# Patient Record
Sex: FEMALE | URBAN - NONMETROPOLITAN AREA
[De-identification: names, ages, dates, MRNs, and addresses within clinical notes are randomized per-mention and may not be internally consistent; named-entity substitution may affect disease eponyms.]

---

## 2023-09-08 ENCOUNTER — APPOINTMENT (OUTPATIENT)
Dept: URBAN - NONMETROPOLITAN AREA CLINIC 47 | Age: 38
Setting detail: DERMATOLOGY
End: 2023-09-10

## 2023-09-08 DIAGNOSIS — B35.3 TINEA PEDIS: ICD-10-CM

## 2023-09-08 PROCEDURE — OTHER PRESCRIPTION: OTHER

## 2023-09-08 PROCEDURE — OTHER COUNSELING: OTHER

## 2023-09-08 PROCEDURE — OTHER KOH PREP: OTHER

## 2023-09-08 PROCEDURE — OTHER OTC TREATMENT REGIMEN: OTHER

## 2023-09-08 PROCEDURE — 99202 OFFICE O/P NEW SF 15 MIN: CPT

## 2023-09-08 RX ORDER — KETOCONAZOLE 20 MG/G
THIN LAYER CREAM TOPICAL TWICE A DAY
Qty: 60 | Refills: 1 | Status: ERX | COMMUNITY
Start: 2023-09-08

## 2023-09-08 ASSESSMENT — LOCATION SIMPLE DESCRIPTION DERM: LOCATION SIMPLE: RIGHT PLANTAR SURFACE

## 2023-09-08 ASSESSMENT — LOCATION ZONE DERM: LOCATION ZONE: FEET

## 2023-09-08 ASSESSMENT — LOCATION DETAILED DESCRIPTION DERM: LOCATION DETAILED: RIGHT MEDIAL PLANTAR MIDFOOT

## 2023-09-08 NOTE — PROCEDURE: OTC TREATMENT REGIMEN
Detail Level: Simple
Patient Specific Otc Recommendations (Will Not Stick From Patient To Patient): Start applying LAMISIL 1% cream (OTC) thin layer twice a day also

## 2023-09-08 NOTE — HPI: EVALUATION OF SKIN LESION(S)
Have Your Spot(S) Been Treated In The Past?: has not been treated
Hpi Title: Evaluation of a Skin Lesion
Additional History: Cortisone being used was helping and when she stopped it came back. Path as not used cortisone for 1 moth now and the lesion seemed to have come back. With t(e use of cortisone the lesion disappeared but was a bit itchy.

## 2023-12-01 ENCOUNTER — APPOINTMENT (OUTPATIENT)
Dept: URBAN - NONMETROPOLITAN AREA CLINIC 47 | Age: 38
Setting detail: DERMATOLOGY
End: 2023-12-04

## 2023-12-01 DIAGNOSIS — B35.3 TINEA PEDIS: ICD-10-CM

## 2023-12-01 PROCEDURE — OTHER COUNSELING: OTHER

## 2023-12-01 PROCEDURE — OTHER PRESCRIPTION: OTHER

## 2023-12-01 PROCEDURE — OTHER PRESCRIPTION MEDICATION MANAGEMENT: OTHER

## 2023-12-01 PROCEDURE — 99212 OFFICE O/P EST SF 10 MIN: CPT

## 2023-12-01 RX ORDER — NAFTIFINE HYDROCHLORIDE 2 G/100G
GEL TOPICAL TWICE DAILY
Qty: 45 | Refills: 2 | Status: ERX | COMMUNITY
Start: 2023-12-01

## 2023-12-01 ASSESSMENT — LOCATION SIMPLE DESCRIPTION DERM: LOCATION SIMPLE: RIGHT FOOT

## 2023-12-01 ASSESSMENT — LOCATION ZONE DERM: LOCATION ZONE: FEET

## 2023-12-01 ASSESSMENT — LOCATION DETAILED DESCRIPTION DERM: LOCATION DETAILED: RIGHT DORSAL FOOT

## 2023-12-01 NOTE — PROCEDURE: PRESCRIPTION MEDICATION MANAGEMENT
Initiate Treatment: Naftin gel twice daily to foot
Render In Strict Bullet Format?: No
Detail Level: Zone
Discontinue Regimen: Lamisil cream and Ketoconazole cream

## 2024-04-17 ENCOUNTER — APPOINTMENT (OUTPATIENT)
Dept: URBAN - METROPOLITAN AREA CLINIC 216 | Age: 39
Setting detail: DERMATOLOGY
End: 2024-04-17

## 2024-04-17 DIAGNOSIS — L24 IRRITANT CONTACT DERMATITIS: ICD-10-CM

## 2024-04-17 PROBLEM — L24.4 IRRITANT CONTACT DERMATITIS DUE TO DRUGS IN CONTACT WITH SKIN: Status: ACTIVE | Noted: 2024-04-17

## 2024-04-17 PROCEDURE — OTHER COUNSELING: OTHER

## 2024-04-17 PROCEDURE — 99213 OFFICE O/P EST LOW 20 MIN: CPT

## 2024-04-17 PROCEDURE — OTHER PRESCRIPTION: OTHER

## 2024-04-17 RX ORDER — CLOBETASOL PROPIONATE 0.5 MG/G
THIN LAYER OINTMENT TOPICAL BID X 2 WEEKS
Qty: 30 | Refills: 0 | Status: ERX | COMMUNITY
Start: 2024-04-17

## 2024-04-17 ASSESSMENT — LOCATION ZONE DERM: LOCATION ZONE: FEET

## 2024-04-17 ASSESSMENT — LOCATION DETAILED DESCRIPTION DERM: LOCATION DETAILED: RIGHT INSTEP

## 2024-04-17 ASSESSMENT — SEVERITY ASSESSMENT 2020: SEVERITY 2020: MILD

## 2024-04-17 ASSESSMENT — BSA RASH: BSA RASH: 1

## 2024-04-17 ASSESSMENT — LOCATION SIMPLE DESCRIPTION DERM: LOCATION SIMPLE: RIGHT PLANTAR SURFACE

## 2024-04-17 NOTE — PROCEDURE: COUNSELING
Patient Specific Counseling (Will Not Stick From Patient To Patient): Use clobetasol x 2 weeks and call if not resolved.
Detail Level: Detailed

## 2024-07-19 ENCOUNTER — APPOINTMENT (OUTPATIENT)
Dept: URBAN - METROPOLITAN AREA CLINIC 216 | Age: 39
Setting detail: DERMATOLOGY
End: 2024-07-21

## 2024-07-19 DIAGNOSIS — L24 IRRITANT CONTACT DERMATITIS: ICD-10-CM

## 2024-07-19 DIAGNOSIS — D22 MELANOCYTIC NEVI: ICD-10-CM

## 2024-07-19 DIAGNOSIS — L82.0 INFLAMED SEBORRHEIC KERATOSIS: ICD-10-CM

## 2024-07-19 PROBLEM — L24.4 IRRITANT CONTACT DERMATITIS DUE TO DRUGS IN CONTACT WITH SKIN: Status: ACTIVE | Noted: 2024-07-19

## 2024-07-19 PROBLEM — D22.5 MELANOCYTIC NEVI OF TRUNK: Status: ACTIVE | Noted: 2024-07-19

## 2024-07-19 PROCEDURE — OTHER COUNSELING: OTHER

## 2024-07-19 PROCEDURE — OTHER PRESCRIPTION: OTHER

## 2024-07-19 PROCEDURE — 99213 OFFICE O/P EST LOW 20 MIN: CPT

## 2024-07-19 RX ORDER — CLOBETASOL PROPIONATE 0.5 MG/G
CREAM TOPICAL BID PRN
Qty: 60 | Refills: 1 | Status: ERX | COMMUNITY
Start: 2024-07-19

## 2024-07-19 ASSESSMENT — LOCATION DETAILED DESCRIPTION DERM
LOCATION DETAILED: LEFT SUPERIOR UPPER BACK
LOCATION DETAILED: RIGHT MID-UPPER BACK
LOCATION DETAILED: PERIUMBILICAL SKIN
LOCATION DETAILED: LEFT INFERIOR UPPER BACK

## 2024-07-19 ASSESSMENT — LOCATION SIMPLE DESCRIPTION DERM
LOCATION SIMPLE: RIGHT UPPER BACK
LOCATION SIMPLE: ABDOMEN
LOCATION SIMPLE: LEFT UPPER BACK

## 2024-07-19 ASSESSMENT — LOCATION ZONE DERM: LOCATION ZONE: TRUNK

## 2024-07-19 NOTE — PROCEDURE: COUNSELING
Detail Level: Detailed
Patient Specific Counseling (Will Not Stick From Patient To Patient): Pt declined treatment
Detail Level: Generalized